# Patient Record
Sex: FEMALE | Race: WHITE
[De-identification: names, ages, dates, MRNs, and addresses within clinical notes are randomized per-mention and may not be internally consistent; named-entity substitution may affect disease eponyms.]

---

## 2017-04-16 ENCOUNTER — HOSPITAL ENCOUNTER (EMERGENCY)
Dept: HOSPITAL 59 - ER | Age: 62
Discharge: HOME | End: 2017-04-16
Payer: COMMERCIAL

## 2017-04-16 DIAGNOSIS — R05: ICD-10-CM

## 2017-04-16 DIAGNOSIS — J06.9: Primary | ICD-10-CM

## 2017-04-16 DIAGNOSIS — J02.9: ICD-10-CM

## 2017-04-16 PROCEDURE — 71020: CPT

## 2017-04-16 PROCEDURE — 99283 EMERGENCY DEPT VISIT LOW MDM: CPT

## 2017-04-16 NOTE — EMERGENCY DEPARTMENT RECORD
History of Present Illness





- General


Chief complaint: ENT


Stated complaint: SORE THROAT/HURTS TO BREATH


Time Seen by Provider: 17 09:24


Source: Patient


Mode of Arrival: Ambulatory


Limitations: No limitations





- History of Present Illness


Initial comments: 


63 yo female presents to ED with a worsening dry cough symptoms and sore throat 

for the past 1 week.  Patient reports a history of lupus and immunocompromised 

state as a result.  Patient reports that her  recently returned from 

[facility] with a cold, and she believes she now has the same cold.  Patient 

denies night sweats or fevers.


MD complaint: Sore throat


Onset/Timin


-: Days(s)


Location: Throat


Severity: Moderate


Severity scale (1-10): 4


Quality: Burning


Consistency: Constant


Improves with: None


Worsens with: None


Associated Symptoms: Sore throat





- Related Data


 Home Medications











 Medication  Instructions  Recorded  Confirmed  Last Taken


 


Hydroxychloroquine Sulfate 200 mg PO BID 11/28/15 04/16/17 04/15/17





[Plaquenil]    


 


Pantoprazole Sodium [Protonix] 40 mg PO DAILY 11/28/15 04/16/17 04/15/17


 


Prednisone [Prednisone 5Mg] 5 mg PO DAILY 11/28/15 04/16/17 04/15/17








 Previous Rx's











 Medication  Instructions  Recorded


 


Azithromycin [Zithromax] 250 mg PO DAILY #6 tab 17











 Allergies











Allergy/AdvReac Type Severity Reaction Status Date / Time


 


Sulfa (Sulfonamide Allergy  VOMITING Verified 17 09:06





Antibiotics)     


 


pregabalin [From Lyrica] AdvReac  ALTERED Verified 17 09:06





   MENTAL  





   STATUS  














Travel Screening





- Travel/Exposure Within Last 30 Days


Have you traveled within the last 30 days?: No





- Travel/Exposure Within Last Year


Have you traveled outside the [place] in the last year?: No





- Additonal Travel Details


Have you been exposed to anyone with a communicable illness?: Yes


Exposure Details::  returned from [facility] with URI





- Travel Symptoms


Symptom Screening: None





Review of Systems


Constitutional: Denies: Chills, Fever, Malaise, Night sweats


Eyes: Denies: Eye discharge, Eye pain


ENT: Reports: Congestion, Throat pain.  Denies: Ear pain, Epistaxis


Respiratory: Reports: Cough.  Denies: Dyspnea


Cardiovascular: Denies: Chest pain, Dyspnea on exertion, Palpitations


Endocrine: Denies: Fatigue, Heat or cold intolerance


Gastrointestinal: Denies: Abdominal pain, Nausea, Vomiting


Genitourinary: Denies: Dysuria, Frequency, Hematuria, Incontinence


Musculoskeletal: Denies: Arthralgia, Back pain, Gout, Joint swelling


Skin: Denies: Bruising, Change in color


Neurological: Denies: Abnormal gait, Confusion, Headache, Seizure


Psychiatric: Denies: Anxiety


Hematological/Lymphatic: Denies: Anemia, Blood Clots





Past Medical History





- SOCIAL HISTORY


Smoking Status: Never smoker


Alcohol Use: Occassional


Drug Use: None





- RESPIRATORY


Hx Respiratory Disorders: No





- CARDIOVASCULAR


Hx Cardio Disorders: No





- NEURO


Hx Neuro Disorders: No





- GI


Hx GI Disorders: No





- 


Hx Genitourinary Disorders: No





- ENDOCRINE


Hx Endocrine Disorders: No





- MUSCULOSKELETAL


Hx Musculoskeletal Disorders: No


Hx Arthritis: Yes (rheumatoid arthritis)


Comment:: Rheumatoid/ Lupus





- PSYCH


Hx Psych Problems: No





- HEMATOLOGY/ONCOLOGY


Hx Hematology/Oncology Disorders: Yes


Comment:: Raynauds and Lupus





Family Medical History


Any Significant Family History?: Yes


Hx Cancer: Grandparents


Hx Diabetes: Father


Hx Heart Disease: Father


Hx HTN: Father, Mother


Hx Stroke: Father, Grandparents





Physical Exam





- General


General Appearance: Alert, Oriented x3, Cooperative, Anxious


Limitations: No limitations





- Head


Head exam: Atraumatic, Normocephalic, Normal inspection


Head exam detail: negative: Abrasion, Contusion, Gilliam's sign, General 

tenderness, Hematoma, Laceration





- Eye


Eye exam: Normal appearance.  negative: Conjunctival injection, Periorbital 

swelling, Periorbital tenderness, Scleral icterus





- ENT


Ear exam: negative: Auricular hematoma, Auricular trauma


Nasal Exam: negative: Active bleeding, Discharge, Dried blood, Foreign body


Mouth exam: negative: Drooling, Laceration, Muffled voice, Tongue elevation


Throat exam: Normal inspection.  negative: Tonsillar erythema, Tonsillomegaly, 

Tonsillar exudate, R peritonsillar mass, L peritonsillar mass





- Neck


Neck exam: Normal inspection.  negative: Meningismus, Tenderness





- Respiratory


Respiratory exam: Normal lung sounds bilaterally.  negative: Rales, Respiratory 

distress, Rhonchi, Stridor





- Cardiovascular


Cardiovascular Exam: Regular rate, Normal rhythm, Normal heart sounds





- GI/Abdominal


GI/Abdominal exam: Soft.  negative: Rebound, Rigid, Tenderness





- Rectal


Rectal exam: Deferred





- 


 exam: Deferred





- Extremities


Extremities exam: Normal inspection.  negative: Calf tenderness, Pedal edema, 

Tenderness





- Back


Back exam: Denies: CVA tenderness (R), CVA tenderness (L)





- Neurological


Neurological exam: Alert, Normal gait, Oriented X3





- Psychiatric


Psychiatric exam: Normal affect, Normal mood





- Skin


Skin exam: Normal color.  negative: Abrasion


Type of lesion: negative: abrasion





Course





 Vital Signs











  17





  09:08


 


Temperature 98.8 F


 


Pulse Rate 87


 


Respiratory 16





Rate 


 


Blood Pressure 122/74


 


Pulse Ox 98














- Reevaluation(s)


Reevaluation #1: 





17 09:43


CXR: No acute process





Patient was updated on all results, and appears stable for discharge on 

Zithromax for her URI symptoms.  Patient appears stable for discharge at this 

time.





Disposition


Disposition: Discharge


Clinical Impression: 


URI (upper respiratory infection)


Qualifiers:


 URI type: unspecified URI Qualified Code(s): J06.9 - Acute upper respiratory 

infection, unspecified


Disposition: Home, Self-Care


Condition: (2) Stable


Instructions:  Upper Respiratory Infection (ED)


Additional Instructions: 


Return to ED if your symptoms worsen or if you have any concerns.


Zithromax as directed.


Follow-up with your family doctor in 3-5 days as directed.


Prescriptions: 


Azithromycin [Zithromax] 250 mg PO DAILY #6 tab


Forms:  Patient Portal Access


Time of Disposition: 09:46

## 2018-12-29 ENCOUNTER — HOSPITAL ENCOUNTER (EMERGENCY)
Dept: HOSPITAL 59 - ER | Age: 63
Discharge: HOME | End: 2018-12-29
Payer: COMMERCIAL

## 2018-12-29 DIAGNOSIS — K52.9: Primary | ICD-10-CM

## 2018-12-29 DIAGNOSIS — R19.7: ICD-10-CM

## 2018-12-29 DIAGNOSIS — R11.2: ICD-10-CM

## 2018-12-29 DIAGNOSIS — R51: ICD-10-CM

## 2018-12-29 DIAGNOSIS — R42: ICD-10-CM

## 2018-12-29 LAB
ANION GAP SERPL CALC-SCNC: 16 MMOL/L (ref 7–16)
BUN SERPL-MCNC: 17 MG/DL (ref 8–23)
CO2 SERPL-SCNC: 24 MMOL/L (ref 22–29)
CREAT SERPL-MCNC: 0.6 MG/DL (ref 0.5–0.9)
EOSINOPHIL NFR BLD: 1 % (ref 0–6)
ERYTHROCYTE [DISTWIDTH] IN BLOOD BY AUTOMATED COUNT: 13.3 % (ref 11.5–14.5)
EST GLOMERULAR FILTRATION RATE: > 60 ML/MIN
GLUCOSE SERPL-MCNC: 108 MG/DL (ref 74–109)
HCT VFR BLD CALC: 41.9 % (ref 35–47)
HGB BLD-MCNC: 13.8 GM/DL (ref 11.6–16)
LIPASE SERPL-CCNC: 15 U/L (ref 13–60)
LYMPHOCYTES NFR BLD: 14 % (ref 16–45)
MCH RBC QN AUTO: 30.8 PG (ref 27–33)
MCHC RBC AUTO-ENTMCNC: 32.9 G/DL (ref 32–36)
MCV RBC AUTO: 93.5 FL (ref 81–97)
MONOCYTES NFR BLD: 2 % (ref 0–9)
NEUTROPHILS NFR BLD AUTO: 83 % (ref 47–80)
PLATELET # BLD: 210 K/UL (ref 130–400)
PMV BLD AUTO: 10.6 FL (ref 7.4–10.4)
RBC # BLD AUTO: 4.48 M/UL (ref 3.8–5.4)
WBC # BLD AUTO: 5.7 K/UL (ref 4.2–12.2)

## 2018-12-29 PROCEDURE — 93010 ELECTROCARDIOGRAM REPORT: CPT

## 2018-12-29 PROCEDURE — 99284 EMERGENCY DEPT VISIT MOD MDM: CPT

## 2018-12-29 PROCEDURE — 80048 BASIC METABOLIC PNL TOTAL CA: CPT

## 2018-12-29 PROCEDURE — 96365 THER/PROPH/DIAG IV INF INIT: CPT

## 2018-12-29 PROCEDURE — 83690 ASSAY OF LIPASE: CPT

## 2018-12-29 PROCEDURE — 93005 ELECTROCARDIOGRAM TRACING: CPT

## 2018-12-29 PROCEDURE — 96361 HYDRATE IV INFUSION ADD-ON: CPT

## 2018-12-29 PROCEDURE — 85027 COMPLETE CBC AUTOMATED: CPT

## 2018-12-29 PROCEDURE — 96375 TX/PRO/DX INJ NEW DRUG ADDON: CPT

## 2018-12-29 NOTE — EMERGENCY DEPARTMENT RECORD
History of Present Illness





- General


Chief Complaint: Dizziness


Stated Complaint: HA/DIZZY/NVD


Time Seen by Provider: 18 15:00


Source: Patient, RN notes reviewed


Mode of Arrival: Ambulatory





- History of Present Illness


Initial Comments: 


vomiting times 15 since early this am and one loose stool





Onset/Timin


-: Days(s)


Timing: Gradual onset


Description: Lightheadedness


History of Same: No


History of Trauma: No





- Bianca Coma Scale


Eye Response: (4) Open spontaneously


Motor Response: (6) Obeys commands


Verbal Response: (5) Oriented


Bianca Total: 15





- Related Data


 Previous Rx's











 Medication  Instructions  Recorded


 


Ondansetron HCl [Zofran] 4 mg PO Q6HR #6 tablet 18











 Allergies











Allergy/AdvReac Type Severity Reaction Status Date / Time


 


Sulfa (Sulfonamide Allergy  VOMITING Verified 18 14:41





Antibiotics)     


 


pregabalin [From Lyrica] AdvReac  ALTERED Verified 18 14:41





   MENTAL  





   STATUS  














Travel Screening





- Travel/Exposure Within Last 30 Days


Have you traveled within the last 30 days?: No





- Travel/Exposure Within Last Year


Have you traveled outside the [place] in the last year?: No





- Additonal Travel Details


Have you been exposed to anyone with a communicable illness?: No





- Travel Symptoms


Symptom Screening: None





Review of Systems


Reviewed: No additional complaints except as noted below


Constitutional: Reports: As per HPI.  Denies: Chills, Fever, Malaise, Night 

sweats, Weakness, Weight change


Eyes: Reports: As per HPI.  Denies: Eye discharge, Eye pain, Photophobia, 

Vision change


ENT: Reports: As per HPI.  Denies: Congestion, Dental pain, Ear pain, Epistaxis

, Hearing loss, Throat pain


Respiratory: Reports: As per HPI.  Denies: Cough, Dyspnea, Hemoptysis, Stridor, 

Wheezes


Cardiovascular: Reports: As per HPI.  Denies: Arrhythmia, Chest pain, Dyspnea 

on exertion, Edema, Murmurs, Orthopnea, Palpitations, Paroxysmal nocturnal 

dyspnea, Rheumatic Fever, Syncope


Endocrine: Reports: As per HPI.  Denies: Fatigue, Heat or cold intolerance, 

Polydipsia, Polyuria


Gastrointestinal: Reports: As per HPI, Diarrhea, Nausea, Vomiting.  Denies: 

Abdominal pain, Constipation, Hematemesis, Hematochezia, Melena


Genitourinary: Reports: As per HPI.  Denies: Abnormal menses, Discharge, 

Dyspareunia, Dysuria, Frequency, Hematuria, Incontinence, Retention, Urgency


Musculoskeletal: Reports: As per HPI.  Denies: Arthralgia, Back pain, Gout, 

Joint swelling, Myalgia, Neck pain


Skin: Reports: As per HPI.  Denies: Bruising, Change in color, Change in hair/

nails, Lesions, Pruritus, Rash


Neurological: Reports: As per HPI.  Denies: Abnormal gait, Confusion, Headache, 

Numbness, Paresthesias, Seizure, Tingling, Tremors, Vertigo, Weakness


Psychiatric: Reports: As per HPI.  Denies: Anxiety, Auditory hallucinations, 

Depression, Homicidal thoughts, Suicidal thoughts, Visual hallucinations


Hematological/Lymphatic: Reports: As per HPI.  Denies: Anemia, Blood Clots, 

Easy bleeding, Easy bruising, Swollen glands





Past Medical History





- SOCIAL HISTORY


Smoking Status: Never smoker


Alcohol Use: Rare, Occasional


Drug Use: None





- RESPIRATORY


Hx Respiratory Disorders: No





- CARDIOVASCULAR


Hx Cardio Disorders: No





- NEURO


Hx Neuro Disorders: No





- GI


Hx GI Disorders: No





- 


Hx Genitourinary Disorders: No





- ENDOCRINE


Hx Endocrine Disorders: No





- MUSCULOSKELETAL


Hx Musculoskeletal Disorders: No


Hx Arthritis: Yes (rheumatoid arthritis)


Comment:: Rheumatoid/ Lupus





- PSYCH


Hx Psych Problems: No





- HEMATOLOGY/ONCOLOGY


Hx Hematology/Oncology Disorders: Yes


Comment:: Raynauds and Lupus





Family Medical History


Any Significant Family History?: Yes


Hx Cancer: Grandparents


Hx Diabetes: Father


Hx Heart Disease: Father


Hx HTN: Father, Mother


Hx Stroke: Father, Grandparents





Physical Exam





- General


General Appearance: Alert, Oriented x3, Cooperative, No acute distress





- Head


Head exam: Normal inspection





- Eye


Eye exam: Normal appearance, PERRL


Pupils: Normal accommodation





- ENT


ENT exam: Normal exam, Mucous membranes moist, Normal external ear exam, Normal 

orophraynx, TM's normal bilaterally


Ear exam: Normal external inspection.  negative: External canal tenderness


Nasal Exam: Normal inspection.  negative: Discharge, Sinus tenderness


Mouth exam: Normal external inspection, Tongue normal


Teeth exam: Normal inspection.  negative: Dental caries


Throat exam: Normal inspection.  negative: Tonsillar erythema, Tonsillar exudate





- Neck


Neck exam: Normal inspection, Full ROM.  negative: Tenderness





- Respiratory


Respiratory exam: Normal lung sounds bilaterally.  negative: Respiratory 

distress





- Cardiovascular


Cardiovascular Exam: Regular rate, Normal rhythm, Normal heart sounds





- GI/Abdominal


GI/Abdominal exam: Soft, Normal bowel sounds.  negative: Tenderness





- Rectal


Rectal exam: Deferred





- 


 exam: Deferred





- Extremities


Extremities exam: Normal inspection, Full ROM, Normal capillary refill.  

negative: Tenderness





- Back


Back exam: Reports: Normal inspection, Full ROM.  Denies: Muscle spasm, Rash 

noted, Tenderness





- Neurological


Neurological exam: Alert, Normal gait, Oriented X3, Reflexes normal





- Psychiatric


Psychiatric exam: Normal affect, Normal mood





- Skin


Skin exam: Dry, Intact, Normal color, Warm





Course





 Vital Signs











  18





  14:34


 


Temperature 98.4 F


 


Pulse Rate 75


 


Respiratory 16





Rate 


 


Blood Pressure 146/69


 


Pulse Ox 99














Medical Decision Making





- Lab Data


Result diagrams: 


 18 15:07





 18 15:07





Disposition


Clinical Impression: 


 Gastroenteritis





Disposition: Home, Self-Care


Condition: (1) Good


Instructions:  Gastroenteritis (ED)


Additional Instructions: 


clear liquids today and slowly increase diet tomorrow with [place] food


Prescriptions: 


Ondansetron HCl [Zofran] 4 mg PO Q6HR #6 tablet


Forms:  Patient Portal Access


Time of Disposition: 17:55





Quality





- Quality Measures


Quality Measures: N/A





- Blood Pressure Screening


Does Patient Have Any of the Following: No


Blood Pressure Classification: Hypertensive Reading


Systolic Measurement: 146


Diastolic Measurement: 69


Screening for High Blood Pressure: < Pre-Hypertensive BP, F/U Documented > [

]


Pre-Hypertensive Follow-up Interventions: Referral to alternative/primary care 

provider.

## 2019-03-14 ENCOUNTER — HOSPITAL ENCOUNTER (OUTPATIENT)
Dept: HOSPITAL 59 - HOP | Age: 64
Discharge: HOME | End: 2019-03-14
Attending: INTERNAL MEDICINE
Payer: COMMERCIAL

## 2019-03-14 DIAGNOSIS — M06.9: ICD-10-CM

## 2019-03-14 DIAGNOSIS — Z12.11: Primary | ICD-10-CM

## 2019-03-14 PROCEDURE — 00812 ANES LWR INTST SCR COLSC: CPT

## 2019-03-15 NOTE — OPERATIVE NOTE
DATE OF SURGERY: 03/14/2019



OPERATION: COLONOSCOPY.



PREOPERATIVE DIAGNOSIS: Screening. 



POSTOPERATIVE DIAGNOSIS: Fair prep, otherwise negative exam. 



PROCEDURE: After informed consent was obtained from the patient, she was placed in the left lateral 
decubitus position in the endoscopy suite, sedated and monitored by the department of anesthesia. 
Digital rectal exam was unremarkable. A well-lubricated [id number] colonoscope was inserted into the 
rectum and advanced to the cecum and ileocecal valve into the terminal ileum. Preparation quality 
was fair. Numerous areas were rinsed and stool evacuated. Some polyps may have been obscured by the 
preparation quality. However, the cecum, ascending colon, transverse colon, descending colon, 
sigmoid colon, and rectum were otherwise unremarkable. No obvious polyps or mass lesions were seen. 
No obstructing lesions were noted. J-turn views of the anorectum and forward views of the rectum 
were unremarkable. The endoscope was straightened, the rectal ampulla deflated, and the endoscope 
was removed. 



RECOMMENDATIONS: I suggest the patient follow a high-fiber diet and use a fiber supplement and 
consider the use of MiraLax should her constipation persist. She should undergo repeat exam in 3 
years based on the preparation quality. 



As always, thank you for allowing me to participate in the healthcare of your patients. CC: MD STEPHY Duque